# Patient Record
Sex: MALE
[De-identification: names, ages, dates, MRNs, and addresses within clinical notes are randomized per-mention and may not be internally consistent; named-entity substitution may affect disease eponyms.]

---

## 2024-04-29 ENCOUNTER — APPOINTMENT (OUTPATIENT)
Dept: ORTHOPEDIC SURGERY | Facility: CLINIC | Age: 39
End: 2024-04-29
Payer: COMMERCIAL

## 2024-04-29 VITALS — BODY MASS INDEX: 25.92 KG/M2 | HEIGHT: 69 IN | WEIGHT: 175 LBS

## 2024-04-29 DIAGNOSIS — M77.02 MEDIAL EPICONDYLITIS, LEFT ELBOW: ICD-10-CM

## 2024-04-29 DIAGNOSIS — Z82.49 FAMILY HISTORY OF ISCHEMIC HEART DISEASE AND OTHER DISEASES OF THE CIRCULATORY SYSTEM: ICD-10-CM

## 2024-04-29 DIAGNOSIS — M25.522 PAIN IN LEFT ELBOW: ICD-10-CM

## 2024-04-29 PROBLEM — Z00.00 ENCOUNTER FOR PREVENTIVE HEALTH EXAMINATION: Status: ACTIVE | Noted: 2024-04-29

## 2024-04-29 PROCEDURE — 73080 X-RAY EXAM OF ELBOW: CPT | Mod: LT

## 2024-04-29 PROCEDURE — 99204 OFFICE O/P NEW MOD 45 MIN: CPT

## 2024-04-29 RX ORDER — NAPROXEN 500 MG/1
500 TABLET ORAL
Qty: 60 | Refills: 0 | Status: ACTIVE | COMMUNITY
Start: 2024-04-29 | End: 1900-01-01

## 2024-04-30 PROBLEM — M77.02 MEDIAL EPICONDYLITIS OF LEFT ELBOW: Status: ACTIVE | Noted: 2024-04-29

## 2024-04-30 NOTE — HISTORY OF PRESENT ILLNESS
[de-identified] : The patient is a 39 year  old right hand dominant male who presents today complaining of left elbow pain.  Date of Injury/Onset: 03/29/23 Pain:    At Rest: 0/10  With Activity:  4-5/10  Mechanism of injury: Gradual onset of pain This is NOT a Work Related Injury being treated under Worker's Compensation. This is NOT an athletic injury occurring associated with an interscholastic or organized sports team. Quality of symptoms: sharp and dull medial elbow pain Improves with: rest Worse with: supination, lifting objects  Prior treatment: none Prior Imaging: none Out of work/sport: Currently working School/Sport/Position/Occupation: , gym, boxing  Additional Information: None

## 2024-04-30 NOTE — IMAGING
[Left] : left elbow [de-identified] : The patient is a well appearing 39 year old male of their stated age. Neck is supple & nontender to palpation. Negative Spurling's test.   Left Shoulder:   ROM: Forward Flexion: 180 degrees Abduction: 180 degrees ER at 90: 90 degrees IR at 90: 45 degrees ER at N: 50 degrees Motor: Abduction: 5 out of 5 FPS: 5 out of 5 Flexion: 5 out of 5 Internal Rotation: 5 out of 5 External Rotation: 5 out of 5 Provocative Testing: Impingement: Negative [place]'s: Negative Other: N/A ----------------------------------- Effected Elbow: LEFT  ROM: Flexion: 0-145 degrees Supination: 90 degrees Pronation: 90 degrees   Inspection: Erythema: None Ecchymosis: None Abrasions: None Effusion: None Deformity: None   Palpation: Crepitus: None Medial Epicondyle/Flexor-Pronator: TENDER  Lateral Epicondyle/ECRB: Nontender Olecranon: Nontender Radial Head: Nontender Humeral Head: Nontender Distal Biceps: Nontender Distal Triceps: Nontender Flexor-Pronator: Nontender Extensor/ECRB: Nontender UCL: Nontender Pronator Intersection: Nontender Ulnar Nerve:  Stable & Nontender   Stress Testing: Varus at 0 Degrees: Stable Varus at 30 Degrees: Stable Valgus at 0 Degrees: Stable Valgus at 30 Degrees: Stable   Motor: Elbow Flexion: 5 out of 5 Elbow Extension: 5 out of 5 Supination: 5 out of 5 Pronation: 5 out of 5 Wrist Flexion: 5 out of 5 Wrist Extension: 5 out of 5 Interossei: 5 out of 5 : 5 out of 5   Provocative Testing: Milking: Negative Moving Valgus Stress: Negative Posterolateral R-I: Negative Hook Test: Negative Resisted Wrist Extension: No pain Resisted Index Finger Extension: No Pain Resisted Middle Finger Extension: No Pain Resisted Wrist Flexion: WITH PAIN Resisted Pronation: No Pain   Neurologic Exam: Axillary Nerve:  SLT Radial Nerve: SLT Median Nerve: SLT Ulnar Nerve:  SLT Other:  N/A Vascular Exam: Radial Pulse: 2+ Ulnar Pulse: 2+ Capillary Refill: <2 Seconds Other Exams: None Pertinent Contralateral Elbow Findings: None   Assessment: The patient is a 39 year old man with left elbow pain and radiographic and physical exam findings consistent with medial epicondylitis.   The patients condition is acute Documents/Results Reviewed Today: X-Ray left elbow  Tests/Studies Independently Interpreted Today: X-Ray left elbow is benign.  Pertinent findings include: 0-140, 90/90, tender medial epicondyle, pain with resisted wrist flexion Confounding medical conditions/concerns: None   Plan: The patient will begin physical therapy, HEP, and stretching for medial epicondylitis. Recommended the patient obtain a Reparel elbow sleeve and topical Voltaren gel to aid in inflammation. The patient will begin use of counter force strap during activity and cock-up wrist splint at night to alleviate symptoms related to medial epicondylitis - Rx provided today. Prescribed the patient Naproxen 500 mg BID for pain management and inflammation - use as directed and take with food. Reviewed all proper activity modifications to avoid aggravation of medial epicondylitis to include limiting dumbbell work. If any pain persists despite conservative management, we will discuss proceeding with a corticosteroid injection. Modify activity as discussed. Tests Ordered: None Prescription Medications Ordered: Naproxen 500mg Braces/DME Ordered: Reparel, counter force strap, cock-up wrist splint Activity/Work/Sports Status: As tolerated Additional Instructions: Topical Voltaren gel Follow-Up: 3 weeks  The patient's current medication management of their orthopedic diagnosis was reviewed today: (1) We discussed a comprehensive treatment plan that included possible pharmaceutical management involving the use of prescription strength medications including but not limited to options such as oral Naprosyn 500mg BID, once daily Meloxicam 15 mg, or 500-650 mg Tylenol versus over the counter oral medications and topical prescription NSAID Pennsaid vs over the counter Voltaren gel.  Based on our extensive discussion, the patient was prescribed Naprosyn 500mg BID for two weeks.  It will then be used PRN for pain, inflammation and discomfort. (2) There is a moderate risk of morbidity with further treatment, especially from use of prescription strength medications and possible side effects of these medications which include upset stomach with oral medications, skin reactions to topical medications and cardiac/renal issues with long term use. (3) I recommended that the patient follow-up with their medical physician to discuss any significant specific potential issues with long term medication use such as interactions with current medications or with exacerbation of underlying medical comorbidities. (4) The benefits and risks associated with use of injectable, oral or topical, prescription and over the counter anti-inflammatory medications were discussed with the patient. The patient voiced understanding of the risks including but not limited to bleeding, stroke, kidney dysfunction, heart disease, and were referred to the black box warning label for further information.  ITerra attest that this documentation has been prepared under the direction and in the presence of Provider Dr. Alber Hawkins.   The documentation recorded by the scribe accurately reflects the services IDr. Alber, personally performed and the decisions made by me. [FreeTextEntry1] : Benign

## 2024-06-10 ENCOUNTER — APPOINTMENT (OUTPATIENT)
Dept: ORTHOPEDIC SURGERY | Facility: CLINIC | Age: 39
End: 2024-06-10